# Patient Record
(demographics unavailable — no encounter records)

---

## 2025-02-03 NOTE — HISTORY OF PRESENT ILLNESS
[de-identified] : Referred by the pediatrician.  Esther is a 9-year-old male with a history of abdominal pain for 2 months. He is otherwise healthy. For 2 months he complains of various types of pain, but mostly in the periumbilical area.  He is also complained of headaches and back pain more frequently.  He has [place] 3 stool on most days.  The parents tried MiraLAX daily and his pain resolved.  On occasion, he complains of a.m. nausea and midsternal chest pain.  He eats a lot of junk food.  He has minimal fruit and vegetable.  There is no vomiting.  His growth has been normal, by report.  He had a set of normal blood work and a normal fecal calprotectin. Sister has IBD.

## 2025-02-03 NOTE — ASSESSMENT
[FreeTextEntry1] : Esther is a 9-year-old boy with a history of periumbilical abdominal pain for 2 months, which resolved on MiraLAX.  He had screening tests since due to his sisters history of VEO-IBD, and the testing was reassuring.  Given that his symptoms have resolved on stool softener he would benefit from an ongoing bowel regimen.  His occasional nausea and chest pain are likely secondary to reflux, possibly from underlying stool burden.  Rx MiraLAX 1 cap per day High-fiber diet encouraged Follow-up in 8 weeks

## 2025-02-03 NOTE — CONSULT LETTER
[Dear  ___] : Dear  [unfilled], [Consult Letter:] : I had the pleasure of evaluating your patient, [unfilled]. [Please see my note below.] : Please see my note below. [Consult Closing:] : Thank you very much for allowing me to participate in the care of this patient.  If you have any questions, please do not hesitate to contact me. [Sincerely,] : Sincerely, [FreeTextEntry3] : Patrica uRiz MD

## 2025-04-01 NOTE — REASON FOR VISIT
[Consultation Follow Up] : a consultation follow up  [Patient] : patient [Parents] : parents [Medical Records] : medical records

## 2025-04-02 NOTE — ASSESSMENT
[Educated Patient & Family about Diagnosis] : educated the patient and family about the diagnosis [FreeTextEntry1] : Rc is a 9 year old with reflux, now resolved, but with pain and constipation likely functional given his stool withholding behaviors and food avoidance on school days. His symptoms occur when he eats 'outside food,' and so have largely suggested lifestyle modification, and avoidance of medications.  Encourage daily miralax Encourage a regular healthy diet with meals TID DC ATC Famotidine, can use Famotidine prn heartburn No indication for regular Periactin dosing, however, family requests refill  FU 6 months as needed

## 2025-04-02 NOTE — END OF VISIT
[FreeTextEntry3] :    I, Dr. Shannon Burnham, personally performed the evaluation and management (E/M) services for this established patient who presents today with followup/ exacerbation of  existing condition. That E/M includes conducting the clinically appropriate interval history &/or exam, assessing all new/exacerbated conditions, and establishing a new plan of care. Today, my ANDREY, [employer] was here to observe my evaluation and management service for this new problem/exacerbated condition and follow the plan of care established by me going forward.  [Time Spent: ___ minutes] : I have spent [unfilled] minutes of time on the encounter which excludes teaching and separately reported services.

## 2025-04-02 NOTE — END OF VISIT
[FreeTextEntry3] :    I, Dr. Shannon Burnham, personally performed the evaluation and management (E/M) services for this established patient who presents today with followup/ exacerbation of  existing condition. That E/M includes conducting the clinically appropriate interval history &/or exam, assessing all new/exacerbated conditions, and establishing a new plan of care. Today, my ANDREY, [employer], was here to observe my evaluation and management service for this new problem/exacerbated condition and follow the plan of care established by me going forward.  [Time Spent: ___ minutes] : I have spent [unfilled] minutes of time on the encounter which excludes teaching and separately reported services.

## 2025-04-02 NOTE — HISTORY OF PRESENT ILLNESS
[de-identified] : 9 year old with no PMH first seen in Feb 2025 for abdominal pain x 2 months.  He had labs that were unremarkable and a calpro of 19. Previous use of MiraLAX resolves is pain. On occasion there is nausea and substernal chest pain. He was started on Famotidine and cyproheptadine. Only took cyproheptadine x 1 week but remains on daily miralax and famotidine BID. Reflux symptoms resolved. Defecating daily but holds in BM while at school. Pain resolves when he eats routine meals and occurs when eating junk food. Pain worsens on days he avoid eating at school, due to not liking food choices, and overeats at home in the evening. +weigh gain.

## 2025-04-02 NOTE — HISTORY OF PRESENT ILLNESS
[de-identified] : 9 year old with no PMH first seen in Feb 2025 for abdominal pain x 2 months.  He had labs that were unremarkable and a calpro of 19. Previous use of MiraLAX resolves is pain. On occasion there is nausea and substernal chest pain. He was started on Famotidine and cyproheptadine. Only took cyproheptadine x 1 week but remains on daily miralax and famotidine BID. Reflux symptoms resolved. Defecating daily but holds in BM while at school. Pain resolves when he eats routine meals and occurs when eating junk food. Pain worsens on days he avoid eating at school, due to not liking food choices, and overeats at home in the evening. +weigh gain.